# Patient Record
Sex: FEMALE | Race: WHITE | NOT HISPANIC OR LATINO | Employment: STUDENT | URBAN - METROPOLITAN AREA
[De-identification: names, ages, dates, MRNs, and addresses within clinical notes are randomized per-mention and may not be internally consistent; named-entity substitution may affect disease eponyms.]

---
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
## 2021-01-20 ENCOUNTER — OFFICE VISIT (OUTPATIENT)
Dept: INTERNAL MEDICINE | Facility: CLINIC | Age: 21
End: 2021-01-20

## 2021-01-20 VITALS
DIASTOLIC BLOOD PRESSURE: 70 MMHG | BODY MASS INDEX: 34.99 KG/M2 | SYSTOLIC BLOOD PRESSURE: 110 MMHG | WEIGHT: 210 LBS | HEIGHT: 65 IN | TEMPERATURE: 97.5 F | HEART RATE: 90 BPM | OXYGEN SATURATION: 99 %

## 2021-01-20 DIAGNOSIS — Z13.0 SCREENING FOR DISORDER OF BLOOD AND BLOOD-FORMING ORGANS: ICD-10-CM

## 2021-01-20 DIAGNOSIS — R53.83 FATIGUE, UNSPECIFIED TYPE: ICD-10-CM

## 2021-01-20 DIAGNOSIS — Z13.29 SCREENING FOR ENDOCRINE, METABOLIC AND IMMUNITY DISORDER: ICD-10-CM

## 2021-01-20 DIAGNOSIS — L65.9 HAIR LOSS: ICD-10-CM

## 2021-01-20 DIAGNOSIS — Z76.89 ENCOUNTER TO ESTABLISH CARE: Primary | ICD-10-CM

## 2021-01-20 DIAGNOSIS — G89.29 CHRONIC PAIN OF RIGHT KNEE: ICD-10-CM

## 2021-01-20 DIAGNOSIS — R51.9 CHRONIC NONINTRACTABLE HEADACHE, UNSPECIFIED HEADACHE TYPE: ICD-10-CM

## 2021-01-20 DIAGNOSIS — Z87.820 HISTORY OF MULTIPLE CONCUSSIONS: ICD-10-CM

## 2021-01-20 DIAGNOSIS — Z13.228 SCREENING FOR ENDOCRINE, METABOLIC AND IMMUNITY DISORDER: ICD-10-CM

## 2021-01-20 DIAGNOSIS — Z13.0 SCREENING FOR ENDOCRINE, METABOLIC AND IMMUNITY DISORDER: ICD-10-CM

## 2021-01-20 DIAGNOSIS — Z00.00 ANNUAL PHYSICAL EXAM: ICD-10-CM

## 2021-01-20 DIAGNOSIS — M25.561 CHRONIC PAIN OF RIGHT KNEE: ICD-10-CM

## 2021-01-20 DIAGNOSIS — G89.29 CHRONIC NONINTRACTABLE HEADACHE, UNSPECIFIED HEADACHE TYPE: ICD-10-CM

## 2021-01-20 LAB
ALBUMIN SERPL-MCNC: 4.6 G/DL (ref 3.5–5.2)
ALBUMIN/GLOB SERPL: 1.7 G/DL
ALP SERPL-CCNC: 100 U/L (ref 39–117)
ALT SERPL-CCNC: 12 U/L (ref 1–33)
AST SERPL-CCNC: 13 U/L (ref 1–32)
BASOPHILS # BLD AUTO: 0.04 10*3/MM3 (ref 0–0.2)
BASOPHILS NFR BLD AUTO: 0.6 % (ref 0–1.5)
BILIRUB SERPL-MCNC: 0.5 MG/DL (ref 0–1.2)
BUN SERPL-MCNC: 9 MG/DL (ref 6–20)
BUN/CREAT SERPL: 11.4 (ref 7–25)
CALCIUM SERPL-MCNC: 9.8 MG/DL (ref 8.6–10.5)
CHLORIDE SERPL-SCNC: 106 MMOL/L (ref 98–107)
CO2 SERPL-SCNC: 23.2 MMOL/L (ref 22–29)
CREAT SERPL-MCNC: 0.79 MG/DL (ref 0.57–1)
EOSINOPHIL # BLD AUTO: 0.2 10*3/MM3 (ref 0–0.4)
EOSINOPHIL NFR BLD AUTO: 2.8 % (ref 0.3–6.2)
ERYTHROCYTE [DISTWIDTH] IN BLOOD BY AUTOMATED COUNT: 12.5 % (ref 12.3–15.4)
GLOBULIN SER CALC-MCNC: 2.7 GM/DL
GLUCOSE SERPL-MCNC: 95 MG/DL (ref 65–99)
HCT VFR BLD AUTO: 42.2 % (ref 34–46.6)
HGB BLD-MCNC: 13.6 G/DL (ref 12–15.9)
IMM GRANULOCYTES # BLD AUTO: 0.03 10*3/MM3 (ref 0–0.05)
IMM GRANULOCYTES NFR BLD AUTO: 0.4 % (ref 0–0.5)
LYMPHOCYTES # BLD AUTO: 1.52 10*3/MM3 (ref 0.7–3.1)
LYMPHOCYTES NFR BLD AUTO: 21.3 % (ref 19.6–45.3)
MCH RBC QN AUTO: 27.9 PG (ref 26.6–33)
MCHC RBC AUTO-ENTMCNC: 32.2 G/DL (ref 31.5–35.7)
MCV RBC AUTO: 86.5 FL (ref 79–97)
MONOCYTES # BLD AUTO: 0.46 10*3/MM3 (ref 0.1–0.9)
MONOCYTES NFR BLD AUTO: 6.4 % (ref 5–12)
NEUTROPHILS # BLD AUTO: 4.89 10*3/MM3 (ref 1.7–7)
NEUTROPHILS NFR BLD AUTO: 68.5 % (ref 42.7–76)
NRBC BLD AUTO-RTO: 0 /100 WBC (ref 0–0.2)
PLATELET # BLD AUTO: 301 10*3/MM3 (ref 140–450)
POTASSIUM SERPL-SCNC: 4.1 MMOL/L (ref 3.5–5.2)
PROT SERPL-MCNC: 7.3 G/DL (ref 6–8.5)
RBC # BLD AUTO: 4.88 10*6/MM3 (ref 3.77–5.28)
SODIUM SERPL-SCNC: 141 MMOL/L (ref 136–145)
T4 FREE SERPL-MCNC: 1.29 NG/DL (ref 0.93–1.7)
TSH SERPL DL<=0.005 MIU/L-ACNC: 0.73 UIU/ML (ref 0.27–4.2)
WBC # BLD AUTO: 7.14 10*3/MM3 (ref 3.4–10.8)

## 2021-01-20 PROCEDURE — 99395 PREV VISIT EST AGE 18-39: CPT | Performed by: NURSE PRACTITIONER

## 2021-01-20 NOTE — PROGRESS NOTES
Date: 2021    Name: Felipa Nicole  : 2000    Chief Complaint:   Chief Complaint   Patient presents with   • Establish Care     physical, rt knee pain       HPI:  Felipa Nicole is a 20 y.o. female presents to establish care, obtain annual physical.  She has just started going to school at [employer].       Felipa has had right knee pain since sustaining an injury while playing soccer in high school, in 2017. Wore band around her knee playing soccer, per .  Did not help with pain.  Currently, pain occurs with ambulation, worse when walking on incline or walking faster.  Pain is located on medial right knee.  There is no pain when sitting, lying down.  Knee is not tender to touch.  Pain rarely radiates into right inner ankle and foot, occurs less frequently than it did at time of injury.  No numbness, burning, tingling of right lower extremity.  Knee does not buckle.    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  Concussions were the reason she quit playing soccer.  She does have frequent headaches.  Acetaminophen is effective.  Denies vision changes/disturbance, dizziness, decreased concentration, photosensitivity, phonosensitivity, nausea, confusion, drowsiness associated with headaches.    She has noticed increase in hair loss while shampooing, brushing her hair over the past year.  Occurs every day.  Hair is fragile and breaks of more easily f, too. Denies weight change, temperature intolerance, hair/skin/nail changes, bowel habit changes, palpitations, anxiety, sore throat, hoarseness.  She is chronically constipated, has noticed blood in her stool when she is constipated.      Recent COVID, diagnosed on 21.  Was tested in [place], near her hometown.  She continues to have loss of smell, feel fatigued.  Denies fever, chills, cough, shortness of breath, rash, myalgia, nausea, vomiting, diarrhea, nasal congestion, sinus pressure.       History:  LMP: No  "LMP recorded. Unknown, on Depoprovera for 2 years.  Last injection 21.  : 0  Para: 0    Do you take any herbs or supplements that were not prescribed by a doctor? yes, was taking thyroid supplements.  Left at home.  Apple cider vinegar supplements.        Health Habits:  Dental Exam. up to date, appointment for 21  Eye Exam. 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 No pertinent past medical history.    History reviewed. No pertinent surgical history.    Family History   Problem Relation Age of Onset   • No Known Problems Mother    • No Known Problems Father    • Hypertension Maternal Grandmother    • Cancer Maternal Grandfather         liver   • No Known Problems Paternal Grandmother    • No Known Problems Paternal Grandfather        Social History     Socioeconomic History   • Marital status: Single     Spouse name: Not on file   • Number of children: Not on file   • Years of education: Not on file   • Highest education level: Not on file   Tobacco Use   • Smoking status: Never Smoker   • Smokeless tobacco: Never Used   Substance and Sexual Activity   • Alcohol use: No     Frequency: Never   • Drug use: No       No Known Allergies      Current Outpatient Medications:   •  estradiol cypionate (DEPO-ESTRADIOL) 5 MG/ML injection, Inject  into the appropriate muscle as directed by prescriber Every 28 (Twenty-Eight) Days., Disp: , Rfl:     ROS:  Review of Systems   All other systems reviewed and are negative.      VS:  Vitals:    21 0813   BP: 110/70   Pulse: 90   Temp: 97.5 °F (36.4 °C)   TempSrc: Infrared   SpO2: 99%   Weight: 95.3 kg (210 lb)   Height: 165.1 cm (65\")     Body mass index is 34.95 kg/m².    PE:  Physical Exam  Constitutional:       Appearance: She is well-developed. She is not ill-appearing.   HENT:      " Head: Normocephalic.      Right Ear: Tympanic membrane, ear canal and external ear normal.      Left Ear: Tympanic membrane, ear canal and external ear normal.      Nose: Nose normal.      Mouth/Throat:      Mouth: Mucous membranes are moist.      Pharynx: Oropharynx is clear. Uvula midline.   Eyes:      Extraocular Movements: Extraocular movements intact.      Conjunctiva/sclera: Conjunctivae normal.      Pupils: Pupils are equal, round, and reactive to light.   Neck:      Musculoskeletal: Full passive range of motion without pain, normal range of motion and neck supple.      Thyroid: No thyromegaly.      Vascular: No carotid bruit.   Cardiovascular:      Rate and Rhythm: Normal rate and regular rhythm.      Pulses: Normal pulses.      Heart sounds: Normal heart sounds.   Pulmonary:      Effort: Pulmonary effort is normal.      Breath sounds: Normal breath sounds.   Abdominal:      General: Bowel sounds are normal.      Palpations: Abdomen is soft.      Tenderness: There is no abdominal tenderness.   Musculoskeletal: Normal range of motion.         General: No deformity.      Right lower leg: No edema.      Left lower leg: No edema.      Comments: Right medial knee slightly edematous, tender to touch.     Lymphadenopathy:      Cervical: No cervical adenopathy.   Skin:     General: Skin is warm.      Capillary Refill: Capillary refill takes less than 2 seconds.   Neurological:      Mental Status: She is alert and oriented to person, place, and time.      Sensory: No sensory deficit.      Coordination: Coordination normal.      Gait: Gait normal.      Comments: CN II-XII normal   Psychiatric:         Attention and Perception: Attention normal.         Mood and Affect: Mood and affect normal.         Speech: Speech normal.         Behavior: Behavior normal.         Thought Content: Thought content normal.         Assessment/Plan:     1. Healthy female exam.   2. Patient Counseling: Including but not Limited to the  following, when appropriate:  --Nutrition: Stressed importance of moderation in sodium/caffeine intake, saturated fat and cholesterol, caloric balance, sufficient intake of fresh fruits, vegetables, fiber, calcium, iron, and 1 mg of folate supplement per day (for females capable of pregnancy).  --Exercise: Stressed the importance of regular exercise.   --Substance Abuse: Discussed cessation/primary prevention of tobacco, alcohol, or other drug use; driving or other dangerous activities under the influence; availability of treatment for abuse, as indicated based on social history.    --Sexuality: Discussed sexually transmitted diseases, partner selection, use of condoms, avoidance of unintended pregnancy  and contraceptive alternatives.   --Injury prevention: Discussed safety belts, safety helmets, smoke detector, smoking near bedding or upholstery.   --Dental health: Discussed importance of regular tooth brushing, flossing, and dental visits.  --Immunizations reviewed. Refused influenza, HPV vaccines.    3. Discussed the patient's BMI with her.  The BMI is above average; BMI management plan is completed  4. Return in about 1 year (around 1/20/2022) for Annual.  5. Age-appropriate Screening Scheduled  6. There are no Patient Instructions on file for this visit.    Diagnoses and all orders for this visit:    1. Encounter to establish care (Primary)    2. Annual physical exam    3. Chronic nonintractable headache, unspecified headache type        -  Normal neuro exam.  Patient wants to have knee pain evaluated before proceeding with further investigation of headaches.  Will discuss in depth at next appointment.  Patient to keep a headache diary, try to identify any triggers    4. History of multiple concussions    5. Chronic pain of right knee  -     Ambulatory Referral to Orthopedic Surgery    6. Fatigue, unspecified type  -     TSH  -     T4, Free    7. Hair loss  -     TSH  -     T4, Free    8. Screening for  disorder of blood and blood-forming organs  -     CBC & Differential    9. Screening for endocrine, metabolic and immunity disorder  -     Comprehensive Metabolic Panel  -     TSH  -     T4, Free    Refused Hep C screening today.        Return in about 1 year (around 1/20/2022) for Annual.

## 2021-01-21 ENCOUNTER — OFFICE VISIT (OUTPATIENT)
Dept: ORTHOPEDIC SURGERY | Facility: CLINIC | Age: 21
End: 2021-01-21

## 2021-01-21 VITALS — WEIGHT: 210 LBS | RESPIRATION RATE: 16 BRPM | HEIGHT: 65 IN | TEMPERATURE: 97.1 F | BODY MASS INDEX: 34.99 KG/M2

## 2021-01-21 DIAGNOSIS — S83.91XA SPRAIN OF RIGHT KNEE, UNSPECIFIED LIGAMENT, INITIAL ENCOUNTER: ICD-10-CM

## 2021-01-21 DIAGNOSIS — M67.51 PLICA SYNDROME OF KNEE, RIGHT: ICD-10-CM

## 2021-01-21 DIAGNOSIS — M25.561 ARTHRALGIA OF RIGHT KNEE: Primary | ICD-10-CM

## 2021-01-21 PROCEDURE — 99203 OFFICE O/P NEW LOW 30 MIN: CPT | Performed by: PHYSICIAN ASSISTANT

## 2021-01-21 NOTE — PROGRESS NOTES
Subjective   Patient ID: Felipa Nicole is a 20 y.o. right hand dominant female  Initial Evaluation of the Right Knee (Referred by Dr. Charles for right medial knee pain. States she had a soccer injury 2 years ago when she collided with another player. Has intermitant knee pain.)         History of Present Illness  Patient presents as a new patient with complaints of right knee pain that has been ongoing for the last 2 years.  She attributes her pain to a soccer injury that occurred 2 years prior when she collided with another player.  Most of her pain is located to the inner aspect of the knee.  She does have occasional instability of the right knee.  Patient has tried heat, rest and oral analgesics without improvement.                                                 History reviewed. No pertinent past medical history.     History reviewed. No pertinent surgical history.    Family History   Problem Relation Age of Onset   • No Known Problems Mother    • No Known Problems Father    • Hypertension Maternal Grandmother    • Cancer Maternal Grandfather         liver   • No Known Problems Paternal Grandmother    • No Known Problems Paternal Grandfather        Social History     Socioeconomic History   • Marital status: Single     Spouse name: Not on file   • Number of children: Not on file   • Years of education: Not on file   • Highest education level: Not on file   Occupational History   • Occupation: student   Tobacco Use   • Smoking status: Never Smoker   • Smokeless tobacco: Never Used   Substance and Sexual Activity   • Alcohol use: No     Frequency: Never   • Drug use: No   Social History Narrative    Right hand dominant         Current Outpatient Medications:   •  estradiol cypionate (DEPO-ESTRADIOL) 5 MG/ML injection, Inject  into the appropriate muscle as directed by prescriber Every 28 (Twenty-Eight) Days., Disp: , Rfl:     No Known Allergies    Review of Systems   Constitutional: Negative for diaphoresis, fever  "and unexpected weight change.   HENT: Negative for dental problem and sore throat.    Eyes: Negative for visual disturbance.   Respiratory: Negative for shortness of breath.    Cardiovascular: Negative for chest pain.   Gastrointestinal: Negative for abdominal pain, constipation, diarrhea, nausea and vomiting.   Genitourinary: Negative for difficulty urinating and frequency.   Musculoskeletal: Positive for arthralgias.   Neurological: Negative for headaches.   Hematological: Does not bruise/bleed easily.   All other systems reviewed and are negative.       I have reviewed the medical and surgical history, family history, social history, medications, and/or allergies, and the review of systems of this report.    Objective   Temp 97.1 °F (36.2 °C)   Resp 16   Ht 165.1 cm (65\")   Wt 95.3 kg (210 lb)   BMI 34.95 kg/m²    Physical Exam  Vitals signs and nursing note reviewed.   Constitutional:       Appearance: Normal appearance.   Cardiovascular:      Pulses: Normal pulses.   Pulmonary:      Effort: Pulmonary effort is normal.   Musculoskeletal:      Right knee: She exhibits no ecchymosis, no deformity, no laceration, no erythema, normal patellar mobility and no MCL laxity. Tenderness found. Medial joint line and MCL tenderness noted.   Neurological:      Mental Status: She is alert and oriented to person, place, and time.   Psychiatric:         Behavior: Behavior normal.       Right Knee Exam     Tenderness   The patient is experiencing tenderness in the medial retinaculum, MCL and medial joint line.    Range of Motion   Extension: 5   Flexion: 120     Other   Erythema: absent  Sensation: normal    Comments:  + plica ttp             Extremity DVT signs are negative on physical exam with negative David sign, no calf pain, no palpable cords and no skin tone change   Neurologic Exam     Mental Status   Oriented to person, place, and time.              Assessment/Plan   Independent Review of Radiographic Studies:  "   X-rays right knee 3 view performed in the office for evaluation of knee pain.  No comparison films were reviewed.  No acute fracture or dislocation noted.      Procedures       Diagnoses and all orders for this visit:    1. Arthralgia of right knee (Primary)  -     XR Knee 3 View Right; Future  -     MRI Knee Right Without Contrast    2. Sprain of right knee, unspecified ligament, initial encounter  -     MRI Knee Right Without Contrast    3. Plica syndrome of knee, right  -     MRI Knee Right Without Contrast       Orthopedic activities reviewed and patient expressed appreciation  Discussion of orthopedic goals  Risk, benefits, and merits of treatment alternatives reviewed with the patient and questions answered  Ice, heat, and/or modalities as beneficial    Recommendations/Plan:  Exercise, medications, injections, other patient advice, and return appointment as noted.  Patient is encouraged to call or return for any issues or concerns.    Follow-up after MRI  Patient agreeable to call or return sooner for any concerns.               EMR Dragon-transcription disclaimer:  This encounter note is an electronic transcription of spoken language to printed text.  Electronic transcription of spoken language may permit erroneous or at times nonsensical words or phrases to be inadvertently transcribed.  Although I have reviewed the note for such errors, some may still exist

## 2021-02-11 ENCOUNTER — HOSPITAL ENCOUNTER (OUTPATIENT)
Dept: MRI IMAGING | Facility: HOSPITAL | Age: 21
Discharge: HOME | End: 2021-02-11
Admitting: PHYSICIAN ASSISTANT

## 2021-02-11 PROCEDURE — 73721 MRI JNT OF LWR EXTRE W/O DYE: CPT

## 2021-02-17 ENCOUNTER — OFFICE VISIT (OUTPATIENT)
Dept: ORTHOPEDIC SURGERY | Facility: CLINIC | Age: 21
End: 2021-02-17

## 2021-02-17 VITALS — RESPIRATION RATE: 18 BRPM | WEIGHT: 210 LBS | HEIGHT: 65 IN | BODY MASS INDEX: 34.99 KG/M2

## 2021-02-17 DIAGNOSIS — M67.51 PLICA SYNDROME OF KNEE, RIGHT: ICD-10-CM

## 2021-02-17 DIAGNOSIS — M25.561 ARTHRALGIA OF RIGHT KNEE: Primary | ICD-10-CM

## 2021-02-17 DIAGNOSIS — M25.861 PATELLOFEMORAL DYSFUNCTION OF RIGHT KNEE: ICD-10-CM

## 2021-02-17 DIAGNOSIS — S83.91XA SPRAIN OF RIGHT KNEE, UNSPECIFIED LIGAMENT, INITIAL ENCOUNTER: ICD-10-CM

## 2021-02-17 PROCEDURE — 99213 OFFICE O/P EST LOW 20 MIN: CPT | Performed by: PHYSICIAN ASSISTANT

## 2021-02-17 NOTE — PROGRESS NOTES
Subjective   Patient ID: Felipa Nicole is a 20 y.o. right hand dominant female  Follow-up of the Right Knee (MRI results)         History of Present Illness    Patient presents to review MRI results of the right knee.  She states she is still experiencing right knee discomfort.  Wearing the knee brace helps on occasion.  Patient has been experiencing intermittent right knee pain for the last 2 years.  Patient denies lower back radiating pain.    History reviewed. No pertinent past medical history.     No past surgical history on file.    Family History   Problem Relation Age of Onset   • No Known Problems Mother    • No Known Problems Father    • Hypertension Maternal Grandmother    • Cancer Maternal Grandfather         liver   • No Known Problems Paternal Grandmother    • No Known Problems Paternal Grandfather        Social History     Socioeconomic History   • Marital status: Single     Spouse name: Not on file   • Number of children: Not on file   • Years of education: Not on file   • Highest education level: Not on file   Occupational History   • Occupation: student   Tobacco Use   • Smoking status: Never Smoker   • Smokeless tobacco: Never Used   Substance and Sexual Activity   • Alcohol use: No     Frequency: Never   • Drug use: No   Social History Narrative    Right hand dominant         Current Outpatient Medications:   •  estradiol cypionate (DEPO-ESTRADIOL) 5 MG/ML injection, Inject  into the appropriate muscle as directed by prescriber Every 28 (Twenty-Eight) Days., Disp: , Rfl:     No Known Allergies    Review of Systems   Constitutional: Negative for diaphoresis, fever and unexpected weight change.   HENT: Negative for dental problem and sore throat.    Eyes: Negative for visual disturbance.   Respiratory: Negative for shortness of breath.    Cardiovascular: Negative for chest pain.   Gastrointestinal: Negative for abdominal pain, constipation, diarrhea, nausea and vomiting.   Genitourinary: Negative for  "difficulty urinating and frequency.   Musculoskeletal: Positive for arthralgias (right knee).   Neurological: Negative for headaches.   Hematological: Does not bruise/bleed easily.       I have reviewed the medical and surgical history, family history, social history, medications, and/or allergies, and the review of systems of this report.    Objective   Resp 18   Ht 165.1 cm (65\")   Wt 95.3 kg (210 lb)   BMI 34.95 kg/m²    Physical Exam  Vitals signs and nursing note reviewed.   Constitutional:       Appearance: Normal appearance.   Pulmonary:      Effort: Pulmonary effort is normal. No respiratory distress.   Musculoskeletal:      Right hip: She exhibits no tenderness and no bony tenderness.      Right knee: She exhibits no swelling, no effusion, no ecchymosis, no deformity, no erythema, no LCL laxity, no bony tenderness and no MCL laxity. Tenderness found. No patellar tendon tenderness noted.      Right ankle: She exhibits no swelling. No tenderness.   Neurological:      Mental Status: She is alert and oriented to person, place, and time.   Psychiatric:         Behavior: Behavior normal.       Right Knee Exam     Range of Motion   Extension: 0   Flexion: 120     Tests   Varus: negative Valgus: negative  Drawer:  Anterior - negative      Patellar apprehension: trace    Other   Sensation: normal  Pulse: present  Effusion: no effusion present        Patient is tender to palpation along the medial plica of the right knee  Extremity DVT signs are negative by clinical screen.   Neurologic Exam     Mental Status   Oriented to person, place, and time.        Positive Chin sign to the left knee      Assessment/Plan   Independent Review of Radiographic Studies:    PROCEDURE: MRI KNEE RIGHT  WO CONTRAST-        HISTORY: prior right knee injury, right knee instability.; M25.561-Pain  in right knee; S83.91XA-Sprain of unspecified site of right knee,  initial encounter; M67.51-Plica syndrome, right knee     COMPARISON: " None.     TECHNIQUE: Multiplanar multisequence imaging of the knee was performed  without the use of intravenous contrast.     FINDINGS: The ACL, PCL, MCL and LCL are intact. There is no evidence of  a meniscal tear. The extensor mechanism is intact. The popliteus tendon  is intact. The articular cartilage is preserved. There is no joint  effusion. Bone marrow signal is homogeneous.     IMPRESSION:  Unremarkable MRI of the knee.     This report was finalized on 2/11/2021 1:32 PM by Hector Dave M.D..  A repeat sunrise 1 view of the right knee in the office reveals no abnormality to the joint space    Procedures       Diagnoses and all orders for this visit:    1. Arthralgia of right knee (Primary)  -     Cancel: XR Knee 1 or 2 View Left; Future  -     Ambulatory Referral to Physical Therapy Evaluate and treat, Ortho; Electrotherapy    2. Plica syndrome of knee, right  -     Ambulatory Referral to Physical Therapy Evaluate and treat, Ortho; Electrotherapy    3. Sprain of right knee, unspecified ligament, initial encounter  -     Ambulatory Referral to Physical Therapy Evaluate and treat, Ortho; Electrotherapy    4. Patellofemoral dysfunction of right knee  -     Ambulatory Referral to Physical Therapy Evaluate and treat, Ortho; Electrotherapy       Discussion of orthopedic goals  Risk, benefits, and merits of treatment alternatives reviewed with the patient and questions answered  Physical therapy referral given  Avoid offending activity    Recommendations/Plan:  Exercise, medications, injections, other patient advice, and return appointment as noted.  Patient is encouraged to call or return for any issues or concerns.    Continue using the knee brace.  I do feel she requires formal physical therapy for at least 6 to 8 weeks.  If no improvement may consider a diagnostic knee arthroscopy with a possible medial plica release    Follow-up after you have had at least 8 full weeks of formal physical  therapy.  Patient agreeable to call or return sooner for any concerns.             EMR Dragon-transcription disclaimer:  This encounter note is an electronic transcription of spoken language to printed text.  Electronic transcription of spoken language may permit erroneous or at times nonsensical words or phrases to be inadvertently transcribed.  Although I have reviewed the note for such errors, some may still exist

## 2021-02-26 ENCOUNTER — TREATMENT (OUTPATIENT)
Dept: PHYSICAL THERAPY | Facility: CLINIC | Age: 21
End: 2021-02-26

## 2021-02-26 DIAGNOSIS — M25.561 CHRONIC PAIN OF RIGHT KNEE: Primary | ICD-10-CM

## 2021-02-26 DIAGNOSIS — G89.29 CHRONIC PAIN OF RIGHT KNEE: Primary | ICD-10-CM

## 2021-02-26 PROCEDURE — 97140 MANUAL THERAPY 1/> REGIONS: CPT | Performed by: PHYSICAL THERAPIST

## 2021-02-26 PROCEDURE — 97110 THERAPEUTIC EXERCISES: CPT | Performed by: PHYSICAL THERAPIST

## 2021-02-26 PROCEDURE — 97161 PT EVAL LOW COMPLEX 20 MIN: CPT | Performed by: PHYSICAL THERAPIST

## 2021-03-01 ENCOUNTER — TREATMENT (OUTPATIENT)
Dept: PHYSICAL THERAPY | Facility: CLINIC | Age: 21
End: 2021-03-01

## 2021-03-01 DIAGNOSIS — M25.561 CHRONIC PAIN OF RIGHT KNEE: Primary | ICD-10-CM

## 2021-03-01 DIAGNOSIS — G89.29 CHRONIC PAIN OF RIGHT KNEE: Primary | ICD-10-CM

## 2021-03-01 PROCEDURE — 97530 THERAPEUTIC ACTIVITIES: CPT | Performed by: PHYSICAL THERAPIST

## 2021-03-01 PROCEDURE — 97110 THERAPEUTIC EXERCISES: CPT | Performed by: PHYSICAL THERAPIST

## 2021-03-01 NOTE — PROGRESS NOTES
Physical Therapy Initial Evaluation and Plan of Care      Patient: Felipa Nicole   : 2000  Diagnosis/ICD-10 Code:  Chronic pain of right knee [M25.561, G89.29]  Referring practitioner: GEE Garcia*    Subjective Evaluation    History of Present Illness  Date of onset: 2020  Mechanism of injury: Contact injury in soccer    Subjective comment: Injured her right knee while colliding with another player when she was a sophomore in high school playing soccer.  Never did anything about as she limping went away.  Started noticing more pain in the knee in the recent months.  Pain is located just to the inside of the knee cap and lower.  Sometimes radiates down a few inches along the inside of the lower leg. Denies difficulty sleeping.  Denies numbness and tingling. (Has had right hip pain located in the front and feels deep.  Not sure exactly when the symptoms started.  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  Progression: worsening    Social Support  Patient lives at: Dormatory on campus.    Patient Goals  Patient goals for therapy: decreased pain, return to sport/leisure activities and increased strength (would like to return to a gym routine)             Objective          Palpation     Additional Palpation Details  (+) tenderness over pes anserinus    Active Range of Motion   Left Knee   Flexion: 135 degrees   Extension: 0 degrees     Right Knee   Flexion: 130 degrees   Extension: 0 degrees   Extensor lag: WFL    Additional Active Range of Motion Details  (-) overpressure for p! Into flx and ext    (+) p! Hip flx/IR PROM to 100 deg/30 deg (anterior hip)    Patellar Mobility     Right Knee Patellar tendons within functional limits include the medial, lateral,  superior and inferior.     Strength/Myotome Testing     Left Knee   Flexion: 5  Extension: 5  Quadriceps contraction: good    Right Knee   Flexion: 4+  Extension: 4-  Quadriceps contraction: fair    Tests     Right Knee   Negative anterior Lachman, patellar apprehension, valgus stress test at 0 degrees and varus stress test at 0 degrees.           Assessment & Plan     Assessment  Impairments: abnormal muscle firing, activity intolerance, impaired physical strength, lacks appropriate home exercise program, pain with function and weight-bearing intolerance  Assessment details: Ms. Nicole is a 20 year old female that presents to physical therapy with a 3 year history of R medial knee pain.  PMH was covered during interview.  R knee mobility is WNL and equal B/L.  R hip mobility is limited into IR and flx secondary to anterior hip pain.  R knee extension strength is limited and has mild difficulty activating the quadriceps.  Will focus on decreasing medial knee sensitivity, improving mm activation about the load and progressing loading program.  Prognosis: fair  Prognosis details: Based on chronicity of symptoms  Functional Limitations: walking, standing and unable to perform repetitive tasks  Goals  Plan Goals: STGs:  1.)  LEFS improved x 1 MCID in 4 weeks.  2.)  Self report at least 25% improvement in symptoms in 4 weeks.  LTGs:  1.)  Be able to walk and stand for more than 1 hour without pain in 6 weeks.  2.)  Self report at least 80% improvement in symptoms in 8 weeks.    Plan  Therapy options: will be seen for skilled physical therapy services  Planned modality interventions: thermotherapy (hydrocollator packs), TENS and cryotherapy  Planned therapy interventions: therapeutic activities, manual therapy, home exercise program, abdominal trunk stabilization, balance/weight-bearing training and functional ROM exercises  Frequency: 2x week  Duration in visits: 16  Duration in weeks: 8  Treatment plan discussed with:  patient        Manual Therapy:    10     mins  62863;  Therapeutic Exercise:    15     mins  30235;     Neuromuscular Hilton:        mins  22777;    Therapeutic Activity:          mins  03825;     Gait Training:           mins  66695;     Ultrasound:          mins  73815;    Electrical Stimulation:         mins  26927 ( );  Dry Needling          mins self-pay    Timed Treatment:   15   mins   Total Treatment:     60   mins    PT SIGNATURE: Alex Adhikari, PT   DATE TREATMENT INITIATED: 3/1/2021    Initial Certification  Certification Period: 5/30/2021  I certify that the therapy services are furnished while this patient is under my care.  The services outlined above are required by this patient, and will be reviewed every 90 days.     PHYSICIAN: Dewey Rivera PA-C      DATE:     Please sign and return via fax to [phone].. Thank you, [facility].

## 2021-03-01 NOTE — PROGRESS NOTES
Physical Therapy Daily Progress Note    Patient Information  Felipa Nicole  2000      Visit # : 2    Felipa Nicole reports 0/10 pain today at rest.  Pt reports some pain over the weekend with walking.  Pt is still wearing tape into PT area.         Objective Pt presents to PT today with no distress noted at rest.     Pt with R anterior hip pain and fatigue from the leg raise exercises - more hip sxs than knee sxs with the exercises.     Pt with overall weakness in the R LE and endurance issues noted.      See Exercise, Manual, and Modality Logs for complete treatment.     Assessment/Plan  Pt with more R hip pain and discomfort with exercises than R knee pain.  Pt really fatigues quickly in the R LE with activity.       Progress per Plan of Care and Progress strengthening /stabilization /functional activity      Visit Diagnoses:    ICD-10-CM ICD-9-CM   1. Chronic pain of right knee  M25.561 719.46    G89.29 338.29            Manual Therapy:         mins  80285;  Therapeutic Exercise:    42     mins  22903;     Neuromuscular Hilton:        mins  01793;    Therapeutic Activity:     12     mins  39605;     Gait Training:           mins  39833;     Ultrasound:          mins  99986;    Electrical Stimulation:         mins  63277 ( );  Dry Needling          mins self-pay    Timed Treatment:   54   mins   Total Treatment:     54   mins          Andrei Pham, PT  Physical Therapist

## 2021-03-05 ENCOUNTER — TREATMENT (OUTPATIENT)
Dept: PHYSICAL THERAPY | Facility: CLINIC | Age: 21
End: 2021-03-05

## 2021-03-05 DIAGNOSIS — G89.29 CHRONIC PAIN OF RIGHT KNEE: Primary | ICD-10-CM

## 2021-03-05 DIAGNOSIS — M25.561 CHRONIC PAIN OF RIGHT KNEE: Primary | ICD-10-CM

## 2021-03-05 PROCEDURE — 97110 THERAPEUTIC EXERCISES: CPT | Performed by: PHYSICAL THERAPIST

## 2021-03-05 PROCEDURE — 97530 THERAPEUTIC ACTIVITIES: CPT | Performed by: PHYSICAL THERAPIST

## 2021-03-05 NOTE — PROGRESS NOTES
Physical Therapy Daily Progress Note    Patient Information  Felipa Nicole  2000      Visit # : 3    Felipa Nicole reports 2-3/10 pain today at rest.  Pt report the knee has pain at rest today.  Pt with MM soreness for the 36 hours after PT.  No knee pain elevated.         Objective Pt presents to PT today with no distress noted.     Pt needed cues for HEP and posture.     Pt with more MM sxs with exercise activity than knee joint.     See Exercise, Manual, and Modality Logs for complete treatment.     Assessment/Plan  Pt is very weak and has limited endurance in the LE MM.        Progress per Plan of Care and Progress strengthening /stabilization /functional activity      Visit Diagnoses:    ICD-10-CM ICD-9-CM   1. Chronic pain of right knee  M25.561 719.46    G89.29 338.29            Manual Therapy:         mins  14746;  Therapeutic Exercise:    42     mins  34150;     Neuromuscular Hilton:        mins  19846;    Therapeutic Activity:     13    mins  99542;     Gait Training:           mins  59562;     Ultrasound:          mins  79568;    Electrical Stimulation:         mins  62528 ( );  Dry Needling          mins self-pay    Timed Treatment:   55   mins   Total Treatment:     55   mins          Andrei Pham, PT  Physical Therapist

## 2021-03-08 ENCOUNTER — TREATMENT (OUTPATIENT)
Dept: PHYSICAL THERAPY | Facility: CLINIC | Age: 21
End: 2021-03-08

## 2021-03-08 DIAGNOSIS — G89.29 CHRONIC PAIN OF RIGHT KNEE: Primary | ICD-10-CM

## 2021-03-08 DIAGNOSIS — M25.561 CHRONIC PAIN OF RIGHT KNEE: Primary | ICD-10-CM

## 2021-03-08 PROCEDURE — 97530 THERAPEUTIC ACTIVITIES: CPT | Performed by: PHYSICAL THERAPIST

## 2021-03-08 PROCEDURE — 97110 THERAPEUTIC EXERCISES: CPT | Performed by: PHYSICAL THERAPIST

## 2021-03-08 NOTE — PROGRESS NOTES
Physical Therapy Daily Progress Note    Patient Information  Felipa Nicole  2000      Visit # : 4    Felipa Nicole reports 0/10 pain today at rest.  3/10 pain first this morning walking on it.  Walking up steps is painful.         Objective Pt presents to PT today with no distress at rest.     Pt with improved tolerance to HEP and stretching.       See Exercise, Manual, and Modality Logs for complete treatment.     Assessment/Plan  Pt with overall activity level improving today.  The knee pain seems to be more with fatigue.       Progress per Plan of Care and Progress strengthening /stabilization /functional activity      Visit Diagnoses:    ICD-10-CM ICD-9-CM   1. Chronic pain of right knee  M25.561 719.46    G89.29 338.29            Manual Therapy:         mins  61003;  Therapeutic Exercise:    42     mins  54696;     Neuromuscular Hilton:        mins  04428;    Therapeutic Activity:     11     mins  65699;     Gait Training:           mins  55937;     Ultrasound:          mins  20073;    Electrical Stimulation:         mins  31455 ( );  Dry Needling          mins self-pay    Timed Treatment:   53   mins   Total Treatment:     53   mins          Andrei Pham, PT  Physical Therapist

## 2021-03-17 ENCOUNTER — TELEPHONE (OUTPATIENT)
Dept: PHYSICAL THERAPY | Facility: CLINIC | Age: 21
End: 2021-03-17

## 2021-03-22 ENCOUNTER — TREATMENT (OUTPATIENT)
Dept: PHYSICAL THERAPY | Facility: CLINIC | Age: 21
End: 2021-03-22

## 2021-03-22 DIAGNOSIS — G89.29 CHRONIC PAIN OF RIGHT KNEE: Primary | ICD-10-CM

## 2021-03-22 DIAGNOSIS — M25.561 CHRONIC PAIN OF RIGHT KNEE: Primary | ICD-10-CM

## 2021-03-22 PROCEDURE — 97530 THERAPEUTIC ACTIVITIES: CPT | Performed by: PHYSICAL THERAPIST

## 2021-03-22 PROCEDURE — 97110 THERAPEUTIC EXERCISES: CPT | Performed by: PHYSICAL THERAPIST

## 2021-03-29 ENCOUNTER — TREATMENT (OUTPATIENT)
Dept: PHYSICAL THERAPY | Facility: CLINIC | Age: 21
End: 2021-03-29

## 2021-03-29 DIAGNOSIS — G89.29 CHRONIC PAIN OF RIGHT KNEE: Primary | ICD-10-CM

## 2021-03-29 DIAGNOSIS — M25.561 CHRONIC PAIN OF RIGHT KNEE: Primary | ICD-10-CM

## 2021-03-29 PROCEDURE — 97530 THERAPEUTIC ACTIVITIES: CPT | Performed by: PHYSICAL THERAPIST

## 2021-03-29 PROCEDURE — 97112 NEUROMUSCULAR REEDUCATION: CPT | Performed by: PHYSICAL THERAPIST

## 2021-03-29 PROCEDURE — 97110 THERAPEUTIC EXERCISES: CPT | Performed by: PHYSICAL THERAPIST

## 2021-03-29 PROCEDURE — 97140 MANUAL THERAPY 1/> REGIONS: CPT | Performed by: PHYSICAL THERAPIST

## 2021-03-29 NOTE — PROGRESS NOTES
Physical Therapy Daily Progress Note    Patient: Felipa Nicole   : 2000  Diagnosis/ICD-10 Code:  Chronic pain of right knee [M25.561, G89.29]  Referring practitioner: AMANDO Garcia  Date of Initial Visit: Type: THERAPY  Noted: 2021  Today's Date: 3/29/2021  Patient seen for 6 sessions         Felipa Nicole reports not being able to assess improvement in her knee pain as she has not tested it.  Does not really know how to test it.  Denies pain travel up or down.  Going down stairs still aggravates the knee.    Subjective     Objective   See Exercise, Manual, and Modality Logs for complete treatment.   *(-) p! With overpressure into flx and ext  *(-)p! With knee ext MMT (5/)  *(+) tenderness along medial and lateral patella and pes anserinus  *(+) p! With bridge w/physioball; isometric DF in hooklying; SLR AROM    Assessment/Plan  Has more localized symptoms to the anterior knee.  Full AROM without pain with overpressure.  Modified exercises to reduce anterior knee loading today.  Has notable difficulty with exercises targeting the lateral hip mm secondary to loss of power.           Manual Therapy:    9     mins  78679;  Therapeutic Exercise:    15     mins  62458;     Neuromuscular Hilton:    9    mins  79737;    Therapeutic Activity:     9     mins  68716;     Gait Training:           mins  13299;     Ultrasound:          mins  61149;    Electrical Stimulation:         mins  29858 ( );  Dry Needling          mins self-pay    Timed Treatment:  42    mins   Total Treatment:     42   mins    Alex Adhikari, PT  Physical Therapist

## 2021-04-07 ENCOUNTER — TREATMENT (OUTPATIENT)
Dept: PHYSICAL THERAPY | Facility: CLINIC | Age: 21
End: 2021-04-07

## 2021-04-07 DIAGNOSIS — M25.561 CHRONIC PAIN OF RIGHT KNEE: Primary | ICD-10-CM

## 2021-04-07 DIAGNOSIS — G89.29 CHRONIC PAIN OF RIGHT KNEE: Primary | ICD-10-CM

## 2021-04-07 PROCEDURE — 97110 THERAPEUTIC EXERCISES: CPT | Performed by: PHYSICAL THERAPIST

## 2021-04-07 PROCEDURE — 97530 THERAPEUTIC ACTIVITIES: CPT | Performed by: PHYSICAL THERAPIST

## 2021-04-07 NOTE — PROGRESS NOTES
Physical Therapy Daily Progress Note    Patient Information  Felipa Nicole  2000      Visit # : 7    Felipa Nicole reports 0/10 pain today at rest.  Pt reports fatigue in the knee after activity.     Pt has had significant pain when her activity levels increases.      Objective Pt presents to PT today with no distress at rest.  Pt needed cues for HEP reproduction.      Pt has not been consistent with her HEP.     MMT:  R HS 4-/5 with less strength upon each trial.       R hip ext 4-/5   R hip ER 4-/5  Palpation:  No pain noted with palpation of the PF joint.  The lateral hip was very sensitive to palpation.       See Exercise, Manual, and Modality Logs for complete treatment.     Assessment/Plan  Pt seems to have more of hip and Hamstring weakness that is limiting appropriate mechanics and function but she is feeling it in her knee.  I feel like she would be doing better if she was more consistent with her strengthening at home.       Progress per Plan of Care  Continue with PT if more approval is given.     Visit Diagnoses:    ICD-10-CM ICD-9-CM   1. Chronic pain of right knee  M25.561 719.46    G89.29 338.29            Manual Therapy:         mins  84394;  Therapeutic Exercise:    41     mins  60238;     Neuromuscular Hilton:        mins  19661;    Therapeutic Activity:     15     mins  38964;     Gait Training:           mins  94631;     Ultrasound:          mins  28591;    Electrical Stimulation:         mins  51395 ( );  Dry Needling          mins self-pay    Timed Treatment:   56   mins   Total Treatment:     56   mins          Andrei Pham, PT  Physical Therapist

## 2021-04-14 ENCOUNTER — OFFICE VISIT (OUTPATIENT)
Dept: ORTHOPEDIC SURGERY | Facility: CLINIC | Age: 21
End: 2021-04-14

## 2021-04-14 VITALS — HEIGHT: 65 IN | TEMPERATURE: 96.9 F | WEIGHT: 210 LBS | RESPIRATION RATE: 16 BRPM | BODY MASS INDEX: 34.99 KG/M2

## 2021-04-14 DIAGNOSIS — M54.50 LOW BACK PAIN WITHOUT SCIATICA, UNSPECIFIED BACK PAIN LATERALITY, UNSPECIFIED CHRONICITY: Primary | ICD-10-CM

## 2021-04-14 DIAGNOSIS — M25.861 PATELLOFEMORAL DYSFUNCTION OF RIGHT KNEE: ICD-10-CM

## 2021-04-14 DIAGNOSIS — M25.551 RIGHT HIP PAIN: ICD-10-CM

## 2021-04-14 DIAGNOSIS — M76.31 IT BAND SYNDROME, RIGHT: ICD-10-CM

## 2021-04-14 DIAGNOSIS — M67.51 PLICA SYNDROME OF KNEE, RIGHT: ICD-10-CM

## 2021-04-14 DIAGNOSIS — M25.561 ARTHRALGIA OF RIGHT KNEE: ICD-10-CM

## 2021-04-14 PROCEDURE — 99214 OFFICE O/P EST MOD 30 MIN: CPT | Performed by: PHYSICIAN ASSISTANT

## 2021-04-14 NOTE — PROGRESS NOTES
Subjective   Patient ID: Felipa Nicole is a 20 y.o. right hand dominant female  Pain and Follow-up of the Right Knee (Follow up right knee pain. Reports still having pain in knee that is now causing pain in right hip as well. No longer wearing brace. Insurance would no longer cover PT as she was making no progress, has not been able to do any home exercises. )         History of Present Illness  Patient is following up for scheduled appointment regarding right knee discomfort.  She is still experiencing pain.  She did attend physical therapy but did not feel like it was helping with her symptoms.  Patient states the insurance stopped covering physical therapy.  She admits that she has not been consistent with HEP due to school work.   Denies numbness to the LE. Does have occasional right lateral hip and low back pain without injury or trauma.       History reviewed. No pertinent past medical history.     History reviewed. No pertinent surgical history.    Family History   Problem Relation Age of Onset   • No Known Problems Mother    • No Known Problems Father    • Hypertension Maternal Grandmother    • Cancer Maternal Grandfather         liver   • No Known Problems Paternal Grandmother    • No Known Problems Paternal Grandfather        Social History     Socioeconomic History   • Marital status: Single     Spouse name: Not on file   • Number of children: Not on file   • Years of education: Not on file   • Highest education level: Not on file   Tobacco Use   • Smoking status: Never Smoker   • Smokeless tobacco: Never Used   Vaping Use   • Vaping Use: Never used   Substance and Sexual Activity   • Alcohol use: No   • Drug use: No         Current Outpatient Medications:   •  estradiol cypionate (DEPO-ESTRADIOL) 5 MG/ML injection, Inject  into the appropriate muscle as directed by prescriber Every 28 (Twenty-Eight) Days., Disp: , Rfl:     No Known Allergies    Review of Systems   Constitutional: Negative for diaphoresis,  "fever and unexpected weight change.   HENT: Negative for dental problem and sore throat.    Eyes: Negative for visual disturbance.   Respiratory: Negative for shortness of breath.    Cardiovascular: Negative for chest pain.   Gastrointestinal: Negative for abdominal pain, constipation, diarrhea, nausea and vomiting.   Genitourinary: Negative for difficulty urinating and frequency.   Musculoskeletal: Positive for arthralgias (Right hip, right knee).   Neurological: Negative for headaches.   Hematological: Does not bruise/bleed easily.       I have reviewed the medical and surgical history, family history, social history, medications, and/or allergies, and the review of systems of this report.    Objective   Temp 96.9 °F (36.1 °C)   Resp 16   Ht 165.1 cm (65\")   Wt 95.3 kg (210 lb)   BMI 34.95 kg/m²    Physical Exam  Vitals and nursing note reviewed.   Constitutional:       Appearance: Normal appearance.   Pulmonary:      Effort: Pulmonary effort is normal.   Musculoskeletal:      Right knee: No bony tenderness. Normal range of motion. Tenderness present over the medial joint line.      Instability Tests: Anterior drawer test negative. Posterior drawer test negative. Anterior Lachman test negative. Medial Fredy test negative and lateral Fredy test negative.   Neurological:      Mental Status: She is alert and oriented to person, place, and time.   Psychiatric:         Behavior: Behavior normal.       Right Knee Exam     Tenderness   The patient is experiencing tenderness in the medial retinaculum and lateral retinaculum.    Range of Motion   Right knee extension: 3.   Flexion: 110     Tests   Fredy:  Medial - negative Lateral - negative  Patellar apprehension: trace    Other   Erythema: absent  Sensation: normal    Comments:  + Morgan sign  + Right IT band TTP      Right Hip Exam     Tenderness   The patient is experiencing tenderness in the lateral.    Tests   FERNANDO: negative  Bettina: positive  Fadir:  " Negative FADIR test    Other   Sensation: normal  Pulse: present           Extremity DVT signs are negative on physical exam with negative David sign, no calf pain, no palpable cords and no skin tone change   Neurologic Exam     Mental Status   Oriented to person, place, and time.        She is nontender over the past anserine bursa      Assessment/Plan   Independent Review of Radiographic Studies:    Prior MRI revealed no internal derangement.  Previous patella sunrise view did not reveal significant abnormal patella tilt.      Procedures       Diagnoses and all orders for this visit:    1. Low back pain without sciatica, unspecified back pain laterality, unspecified chronicity (Primary)  -     XR Spine Lumbar 2 or 3 View; Future    2. Right hip pain  -     XR Hip With or Without Pelvis 2 - 3 View Right; Future    3. Arthralgia of right knee    4. Plica syndrome of knee, right    5. Patellofemoral dysfunction of right knee    6. It band syndrome, right        Orthopedic activities reviewed and patient expressed appreciation  Discussion of orthopedic goals  Risk, benefits, and merits of treatment alternatives reviewed with the patient and questions answered  The nature of the proposed surgery reviewed with the patient including risks, benefits, rehabilitation, recovery timeframe, and outcome expectations    Recommendations/Plan:  Exercise, medications, injections, other patient advice, and return appointment as noted.  Patient is encouraged to call or return for any issues or concerns.  We had a lengthy discussion in the office today.  Patient would like to avoid diagnostic knee ATS at this time.   She states her grandfather does not recommend her having a cortisone injection.  She also expresses concerns that she may not be able to participate with therapy due to being busy with school.  I offered her the cortisone injection as well as seeing an occupational therapist so insurance may cover additional PT visits.   We would also tailor the therapy specific for IT band and bursitis of the right hip  I even offered the patient a second opinion from a knee subspecialist.  Patient agreeable to call or return sooner for any concerns.               EMR Dragon-transcription disclaimer:  This encounter note is an electronic transcription of spoken language to printed text.  Electronic transcription of spoken language may permit erroneous or at times nonsensical words or phrases to be inadvertently transcribed.  Although I have reviewed the note for such errors, some may still exist

## 2023-02-06 ENCOUNTER — OFFICE VISIT (OUTPATIENT)
Dept: INTERNAL MEDICINE | Facility: CLINIC | Age: 23
End: 2023-02-06
Payer: COMMERCIAL

## 2023-02-06 VITALS
TEMPERATURE: 98.1 F | HEART RATE: 112 BPM | BODY MASS INDEX: 38.82 KG/M2 | WEIGHT: 233 LBS | DIASTOLIC BLOOD PRESSURE: 76 MMHG | SYSTOLIC BLOOD PRESSURE: 120 MMHG | HEIGHT: 65 IN | OXYGEN SATURATION: 98 %

## 2023-02-06 DIAGNOSIS — Z97.5 IUD (INTRAUTERINE DEVICE) IN PLACE: ICD-10-CM

## 2023-02-06 DIAGNOSIS — E66.09 CLASS 2 OBESITY DUE TO EXCESS CALORIES WITHOUT SERIOUS COMORBIDITY WITH BODY MASS INDEX (BMI) OF 38.0 TO 38.9 IN ADULT: ICD-10-CM

## 2023-02-06 DIAGNOSIS — L65.9 ALOPECIA: ICD-10-CM

## 2023-02-06 DIAGNOSIS — Z00.00 ENCOUNTER FOR ANNUAL PHYSICAL EXAMINATION EXCLUDING GYNECOLOGICAL EXAMINATION IN A PATIENT OLDER THAN 17 YEARS: Primary | ICD-10-CM

## 2023-02-06 DIAGNOSIS — E28.2 PCOS (POLYCYSTIC OVARIAN SYNDROME): ICD-10-CM

## 2023-02-06 DIAGNOSIS — E55.9 VITAMIN D DEFICIENCY: ICD-10-CM

## 2023-02-06 PROBLEM — N92.6 IRREGULAR MENSES: Status: ACTIVE | Noted: 2022-01-13

## 2023-02-06 LAB
EXPIRATION DATE: NORMAL
HBA1C MFR BLD: 5.1 %
Lab: NORMAL

## 2023-02-06 PROCEDURE — 99395 PREV VISIT EST AGE 18-39: CPT | Performed by: NURSE PRACTITIONER

## 2023-02-06 PROCEDURE — 83036 HEMOGLOBIN GLYCOSYLATED A1C: CPT | Performed by: NURSE PRACTITIONER

## 2023-02-06 NOTE — PROGRESS NOTES
Chief Complaint   Patient presents with   • Annual Exam     Hair loss       Felipa Nicole is a 22 y.o. female and is here for a comprehensive physical exam.     She has been experience significant hair loss.  Bald spots on scalp that have been growing in size for the past 2 years.  Recently diagnosed with PCOS.  Not currently taking medications.  Tries to eat a well-balanced diet.  Denies abnormal fatigue, weight change, temperature intolerance, hair/skin/nail changes, bowel habit changes, palpitations, anxiety, sore throat, hoarseness.      History:  LMP: 24   Menarche: 13 years old  Sexual activity:  Heterosexual, monogamous relationship x 6 months  Last pap date: Not yet indicated.  Will schedule first pap smear with gynecology  : 0  Para: 0    Do you take any herbs or supplements that were not prescribed by a doctor? yes, cortisol supplements, biotin, magnesium, zinc, prenatal    Health Habits:  Dental Exam. not up to date - plans to schedule appointment   Eye Exam. up to date, wears glasses/contacts  Exercise: 0 times/week.  Current exercise activities include: none   Diet: typical American diet, does not like vegetables     Health Maintenance   Topic Date Due   • HEPATITIS C SCREENING  Never done   • CHLAMYDIA SCREENING  Never done   • PAP SMEAR  Never done   • HPV VACCINES (1 - 2-dose series) 2024 (Originally 10/12/2012)   • TDAP/TD VACCINES (1 - Tdap) 2024 (Originally 10/12/2020)   • COVID-19 Vaccine (3 - Booster for Pfizer series) 2024 (Originally 2021)   • INFLUENZA VACCINE  2024 (Originally 2022)   • ANNUAL PHYSICAL  2024   • MENINGOCOCCAL VACCINE  Completed   • Pneumococcal Vaccine 0-64  Aged Out       PMH, PSH, SocHx, FamHx, Allergies, and Medications: Reviewed and updated in the Visit Navigator.     Allergies   Allergen Reactions   • Histamine Unknown - Low Severity   • Adhesive Tape Rash   • Formaldehyde Itching and Rash     Past Medical History:  "  Diagnosis Date   • Allergic Summer of 2021    Ahesive, Latex, Formaldehyde   • History of medical problems January 2022    Pcos     History reviewed. No pertinent surgical history.  Social History     Socioeconomic History   • Marital status: Single   Tobacco Use   • Smoking status: Never   • Smokeless tobacco: Never   Vaping Use   • Vaping Use: Never used   Substance and Sexual Activity   • Alcohol use: Yes     Comment: Drinks but rare   • Drug use: No   • Sexual activity: Yes     Partners: Male     Birth control/protection: I.U.D.     Family History   Problem Relation Age of Onset   • Hypertension Mother         Pancreatitis   • No Known Problems Father    • Hypertension Maternal Grandmother    • Cancer Maternal Grandfather         liver   • No Known Problems Paternal Grandmother    • No Known Problems Paternal Grandfather        Review of Systems  Review of Systems   All other systems reviewed and are negative.      Objective   /76   Pulse 112   Temp 98.1 °F (36.7 °C)   Ht 165.1 cm (65\")   Wt 106 kg (233 lb)   SpO2 98%   BMI 38.77 kg/m²   Body mass index is 38.77 kg/m².    Physical Exam  Constitutional:       Appearance: She is well-developed. She is morbidly obese. She is not ill-appearing.   HENT:      Head: Normocephalic.      Right Ear: Tympanic membrane, ear canal and external ear normal.      Left Ear: Tympanic membrane, ear canal and external ear normal.      Nose: Nose normal.      Mouth/Throat:      Mouth: Mucous membranes are moist.      Pharynx: Oropharynx is clear. Uvula midline.   Eyes:      Extraocular Movements: Extraocular movements intact.      Conjunctiva/sclera: Conjunctivae normal.      Pupils: Pupils are equal, round, and reactive to light.   Neck:      Thyroid: No thyromegaly.      Vascular: No carotid bruit.   Cardiovascular:      Rate and Rhythm: Normal rate and regular rhythm.      Pulses:           Radial pulses are 2+ on the right side and 2+ on the left side.        " Dorsalis pedis pulses are 2+ on the right side and 2+ on the left side.      Heart sounds: Normal heart sounds.   Pulmonary:      Effort: Pulmonary effort is normal.      Breath sounds: Normal breath sounds.   Abdominal:      General: Bowel sounds are normal.      Palpations: Abdomen is soft.      Tenderness: There is no abdominal tenderness.   Musculoskeletal:         General: No tenderness or deformity. Normal range of motion.      Cervical back: Full passive range of motion without pain, normal range of motion and neck supple.      Right lower leg: No edema.      Left lower leg: No edema.   Lymphadenopathy:      Cervical: No cervical adenopathy.   Skin:     General: Skin is warm.      Capillary Refill: Capillary refill takes less than 2 seconds.      Comments: Thin hair, bald in scattered areas without scarring   Neurological:      Mental Status: She is alert and oriented to person, place, and time.      Sensory: No sensory deficit.      Coordination: Coordination normal.      Gait: Gait normal.      Comments: CN II-XII normal   Psychiatric:         Attention and Perception: Attention normal.         Mood and Affect: Mood and affect normal.         Speech: Speech normal.         Behavior: Behavior normal.         Thought Content: Thought content normal.         Assessment & Plan   1. Healthy female exam.    2. Patient Counseling: Including but not Limited to the following, when appropriate:  --Nutrition: Stressed importance of moderation in sodium/caffeine intake, saturated fat and cholesterol, caloric balance, sufficient intake of fresh fruits, vegetables, fiber, calcium, iron, and 1 mg of folate supplement per day (for females capable of pregnancy).  --Exercise: Stressed the importance of regular exercise.   --Substance Abuse: Discussed cessation/primary prevention of tobacco, alcohol, or other drug use; driving or other dangerous activities under the influence; availability of treatment for abuse, as indicated  based on social history.    --Sexuality: Discussed sexually transmitted diseases, partner selection, use of condoms, avoidance of unintended pregnancy  and contraceptive alternatives.   --Injury prevention: Discussed safety belts, safety helmets, smoke detector, smoking near bedding or upholstery.   --Dental health: Discussed importance of regular tooth brushing, flossing, and dental visits.  --Immunizations reviewed.  3. Discussed the patient's BMI with her.  The BMI is above average; BMI management plan is completed  4. Return in about 1 year (around 2/6/2024) for Annual.  5. Age-appropriate Screening Scheduled    Assessment & Plan     Diagnoses and all orders for this visit:    1. Encounter for annual physical examination excluding gynecological examination in a patient older than 17 years (Primary)    2. Alopecia  -     POC Glycosylated Hemoglobin (Hb A1C)  -     Cortisol - AM  -     Heavy Metals, Blood  -     DHEA  -     RPR  -     Vitamin B12  -     Folate  -     Vitamin B6  -     Vitamin B1, Whole Blood  -     LUDIVINA  -     C-reactive Protein  -     Ferritin  -     Iron  -     Magnesium  -     Vitamin D,25-Hydroxy  -     T3, Free  -     T4, Free  -     Thyroid Peroxidase Antibody  -     TSH  -     Sedimentation rate, automated    3. PCOS (polycystic ovarian syndrome)  -     POC Glycosylated Hemoglobin (Hb A1C)    4. Class 2 obesity due to excess calories without serious comorbidity with body mass index (BMI) of 38.0 to 38.9 in adult        - Class 2 Severe Obesity (BMI >=35 and <=39.9). Obesity-related health conditions include the following: none. Obesity is unchanged. BMI is is above average; BMI management plan is completed. We discussed low calorie, low carb based diet program, portion control and increasing exercise.    5. IUD (intrauterine device) in place

## 2023-02-17 LAB
25(OH)D3+25(OH)D2 SERPL-MCNC: 23.6 NG/ML (ref 30–100)
ANA SER QL: NEGATIVE
ARSENIC BLD-MCNC: <1 UG/L (ref 0–9)
CORTIS AM PEAK SERPL-MCNC: 13.8 UG/DL (ref 6.2–19.4)
CRP SERPL-MCNC: 12 MG/L (ref 0–10)
DHEA SERPL-MCNC: 804 NG/DL (ref 31–701)
ERYTHROCYTE [SEDIMENTATION RATE] IN BLOOD BY WESTERGREN METHOD: 34 MM/HR (ref 0–32)
FERRITIN SERPL-MCNC: 73 NG/ML (ref 15–150)
FOLATE SERPL-MCNC: 13.6 NG/ML
IRON SERPL-MCNC: 67 UG/DL (ref 27–159)
LEAD BLDV-MCNC: <1 UG/DL (ref 0–3.4)
MAGNESIUM SERPL-MCNC: 2.1 MG/DL (ref 1.6–2.3)
MERCURY BLD-MCNC: <1 UG/L (ref 0–14.9)
PYRIDOXAL PHOS SERPL-MCNC: 14.6 UG/L (ref 3.4–65.2)
RPR SER QL: NON REACTIVE
T3FREE SERPL-MCNC: 3.6 PG/ML (ref 2–4.4)
T4 FREE SERPL-MCNC: 0.9 NG/DL (ref 0.82–1.77)
THYROPEROXIDASE AB SERPL-ACNC: 13 IU/ML (ref 0–34)
TSH SERPL DL<=0.005 MIU/L-ACNC: 1.4 UIU/ML (ref 0.45–4.5)
VIT B1 BLD-SCNC: 175.4 NMOL/L (ref 66.5–200)
VIT B12 SERPL-MCNC: 536 PG/ML (ref 232–1245)

## 2023-02-21 RX ORDER — ERGOCALCIFEROL 1.25 MG/1
50000 CAPSULE ORAL WEEKLY
Qty: 12 CAPSULE | Refills: 0 | Status: SHIPPED | OUTPATIENT
Start: 2023-02-21

## 2024-02-06 ENCOUNTER — PATIENT MESSAGE (OUTPATIENT)
Dept: INTERNAL MEDICINE | Facility: CLINIC | Age: 24
End: 2024-02-06
Payer: COMMERCIAL
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
## 2024-02-09 ENCOUNTER — OFFICE VISIT (OUTPATIENT)
Dept: INTERNAL MEDICINE | Facility: CLINIC | Age: 24
End: 2024-02-09
Payer: COMMERCIAL

## 2024-02-09 VITALS
WEIGHT: 235 LBS | TEMPERATURE: 98.2 F | DIASTOLIC BLOOD PRESSURE: 78 MMHG | HEART RATE: 77 BPM | SYSTOLIC BLOOD PRESSURE: 110 MMHG | HEIGHT: 65 IN | BODY MASS INDEX: 39.15 KG/M2 | OXYGEN SATURATION: 98 %

## 2024-02-09 DIAGNOSIS — Z00.00 ENCOUNTER FOR ANNUAL PHYSICAL EXAMINATION EXCLUDING GYNECOLOGICAL EXAMINATION IN A PATIENT OLDER THAN 17 YEARS: Primary | ICD-10-CM

## 2024-02-09 DIAGNOSIS — F33.1 MODERATE EPISODE OF RECURRENT MAJOR DEPRESSIVE DISORDER: ICD-10-CM

## 2024-02-09 DIAGNOSIS — F41.9 ANXIETY: ICD-10-CM

## 2024-02-09 DIAGNOSIS — J01.40 ACUTE NON-RECURRENT PANSINUSITIS: ICD-10-CM

## 2024-02-09 DIAGNOSIS — E66.9 CLASS 2 OBESITY WITH BODY MASS INDEX (BMI) OF 35 TO 39.9 WITHOUT COMORBIDITY: ICD-10-CM

## 2024-02-09 PROCEDURE — 99395 PREV VISIT EST AGE 18-39: CPT | Performed by: NURSE PRACTITIONER

## 2024-02-09 RX ORDER — CETIRIZINE HYDROCHLORIDE 10 MG/1
TABLET ORAL
COMMUNITY

## 2024-02-09 RX ORDER — AMOXICILLIN AND CLAVULANATE POTASSIUM 875; 125 MG/1; MG/1
1 TABLET, FILM COATED ORAL 2 TIMES DAILY
Qty: 20 TABLET | Refills: 0 | Status: SHIPPED | OUTPATIENT
Start: 2024-02-09 | End: 2024-02-19

## 2024-02-09 RX ORDER — ACETAMINOPHEN 325 MG/1
TABLET ORAL
COMMUNITY

## 2024-02-09 RX ORDER — SPIRONOLACTONE 100 MG/1
1 TABLET, FILM COATED ORAL DAILY
COMMUNITY
Start: 2024-01-19
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
## 2024-04-28 ENCOUNTER — PATIENT MESSAGE (OUTPATIENT)
Dept: INTERNAL MEDICINE | Facility: CLINIC | Age: 24
End: 2024-04-28
Payer: COMMERCIAL

## 2024-04-28 DIAGNOSIS — F33.1 MODERATE EPISODE OF RECURRENT MAJOR DEPRESSIVE DISORDER: ICD-10-CM

## 2024-04-28 DIAGNOSIS — F41.9 ANXIETY: Primary | ICD-10-CM

## 2024-05-03 NOTE — TELEPHONE ENCOUNTER
From: Felipa Nicole  To: Leora Charles  Sent: 4/28/2024 10:10 PM EDT  Subject: JULIO note     Would I be able to get a doctor note to be able to get an JULIO for my dorm? I believe it would help with my mentally state. I’ve tried therapy and my therapist drop me. I’m looking for a new one.